# Patient Record
Sex: FEMALE | URBAN - METROPOLITAN AREA
[De-identification: names, ages, dates, MRNs, and addresses within clinical notes are randomized per-mention and may not be internally consistent; named-entity substitution may affect disease eponyms.]

---

## 2023-06-08 ENCOUNTER — OFFICE VISIT (OUTPATIENT)
Facility: LOCATION | Age: 68
End: 2023-06-08
Payer: MEDICARE

## 2023-06-08 DIAGNOSIS — H40.013 OPEN ANGLE WITH BORDERLINE FINDINGS, LOW RISK, BILATERAL: ICD-10-CM

## 2023-06-08 DIAGNOSIS — H04.123 DRY EYE SYNDROME OF BILATERAL LACRIMAL GLANDS: Primary | ICD-10-CM

## 2023-06-08 PROCEDURE — 99213 OFFICE O/P EST LOW 20 MIN: CPT | Performed by: STUDENT IN AN ORGANIZED HEALTH CARE EDUCATION/TRAINING PROGRAM

## 2023-06-08 ASSESSMENT — INTRAOCULAR PRESSURE
OS: 12
OD: 13

## 2023-06-08 NOTE — IMPRESSION/PLAN
Impression: CC: Patient presents today for 3 month surface check OU w/ Dr. Mario Talbert. Patient reports she noticed improvement with plugs but they have started to wear off. Patient has tried pataday w/ no improvement. Patient has tried Lastacaft w/ no improvement and did not like it Patient has tried Zaditor w/ no improvement and did not like it S/P Ext duration plugs x4 (02/08/2023) Plan: Informed patient dryness has improved and we can replace plugs and continue with current treatment plan. Continue Hot compresses BID OU Continue Lid Scrubs BID OU Continue PF AT's QID OU Continue Refresh Gel QHS OU

RTC in 2 months for surface check and EXT duration plugs x4 w/ Dr. Mario Talbert

## 2023-06-08 NOTE — IMPRESSION/PLAN
Impression: History of optic neuritis OS

*Patient refuses dilation* Discussed importance of dilation to check in the back of the eye. Patient expressed understanding and defers at this time. Plan: Testing: 
OCT/ONH 03/2023: Borderline N OD, Thin T OS, GCC thinning baseline OU
HVF 24-2 03/2023: 
PACHY: [phone] Gonio 01/2023:  OU Previously: 
IOP acceptable OU Testing reviewed Informed patient based of baseline testing today there are no signs of glaucoma. Stressed the importance of routine care given suspicious appearance of optic nerves and risk of glaucoma to prevent permanent vision loss. Will monitor without treatment at this time. Patient expressed understanding. Plan:
No Treatment indicated at this time.

## 2023-08-10 ENCOUNTER — OFFICE VISIT (OUTPATIENT)
Facility: LOCATION | Age: 68
End: 2023-08-10
Payer: MEDICARE

## 2023-08-10 DIAGNOSIS — H40.013 OPEN ANGLE WITH BORDERLINE FINDINGS, LOW RISK, BILATERAL: ICD-10-CM

## 2023-08-10 DIAGNOSIS — H04.123 DRY EYE SYNDROME OF BILATERAL LACRIMAL GLANDS: Primary | ICD-10-CM

## 2023-08-10 ASSESSMENT — INTRAOCULAR PRESSURE
OS: 16
OD: 15

## 2025-01-30 ENCOUNTER — OFFICE VISIT (OUTPATIENT)
Facility: LOCATION | Age: 70
End: 2025-01-30
Payer: MEDICARE

## 2025-01-30 DIAGNOSIS — H16.223 KERATOCONJUNCT SICCA, NOT SPECIFIED AS SJOGREN'S, BILATERAL: ICD-10-CM

## 2025-01-30 DIAGNOSIS — H25.13 AGE-RELATED NUCLEAR CATARACT, BILATERAL: ICD-10-CM

## 2025-01-30 DIAGNOSIS — H04.123 DRY EYE SYNDROME OF BILATERAL LACRIMAL GLANDS: Primary | ICD-10-CM

## 2025-01-30 DIAGNOSIS — H40.013 OPEN ANGLE WITH BORDERLINE FINDINGS, LOW RISK, BILATERAL: ICD-10-CM

## 2025-01-30 PROCEDURE — 99213 OFFICE O/P EST LOW 20 MIN: CPT | Performed by: STUDENT IN AN ORGANIZED HEALTH CARE EDUCATION/TRAINING PROGRAM

## 2025-01-30 RX ORDER — PERFLUOROHEXYLOCTANE 1 MG/MG
100 % SOLUTION OPHTHALMIC
Qty: 3 | Refills: 11 | Status: ACTIVE
Start: 2025-01-30

## 2025-01-30 RX ORDER — CYCLOSPORINE 0 G/ML
0.09 % SOLUTION/ DROPS OPHTHALMIC; TOPICAL
Qty: 60 | Refills: 11 | Status: ACTIVE
Start: 2025-01-30

## 2025-01-30 ASSESSMENT — INTRAOCULAR PRESSURE
OD: 16
OS: 17

## 2025-05-28 ENCOUNTER — OFFICE VISIT (OUTPATIENT)
Facility: LOCATION | Age: 70
End: 2025-05-28
Payer: MEDICARE

## 2025-05-28 DIAGNOSIS — G35 MULTIPLE SCLEROSIS: ICD-10-CM

## 2025-05-28 DIAGNOSIS — H40.013 OPEN ANGLE WITH BORDERLINE FINDINGS, LOW RISK, BILATERAL: ICD-10-CM

## 2025-05-28 DIAGNOSIS — H04.123 DRY EYE SYNDROME OF BILATERAL LACRIMAL GLANDS: ICD-10-CM

## 2025-05-28 DIAGNOSIS — H25.13 AGE-RELATED NUCLEAR CATARACT, BILATERAL: Primary | ICD-10-CM

## 2025-05-28 DIAGNOSIS — Z79.899 OTHER LONG TERM (CURRENT) DRUG THERAPY: ICD-10-CM

## 2025-05-28 PROCEDURE — 99213 OFFICE O/P EST LOW 20 MIN: CPT | Performed by: STUDENT IN AN ORGANIZED HEALTH CARE EDUCATION/TRAINING PROGRAM

## 2025-05-28 RX ORDER — VARENICLINE 0.03 MG/.05ML
SPRAY NASAL
Qty: 5 | Refills: 3 | Status: ACTIVE
Start: 2025-05-28

## 2025-05-28 ASSESSMENT — INTRAOCULAR PRESSURE
OS: 17
OD: 17